# Patient Record
Sex: MALE | Race: WHITE | NOT HISPANIC OR LATINO | Employment: OTHER | URBAN - METROPOLITAN AREA
[De-identification: names, ages, dates, MRNs, and addresses within clinical notes are randomized per-mention and may not be internally consistent; named-entity substitution may affect disease eponyms.]

---

## 2024-06-27 ENCOUNTER — HOSPITAL ENCOUNTER (OUTPATIENT)
Facility: HOSPITAL | Age: 44
Discharge: HOME | End: 2024-06-27
Attending: EMERGENCY MEDICINE | Admitting: EMERGENCY MEDICINE
Payer: MEDICARE

## 2024-06-27 VITALS
WEIGHT: 181 LBS | DIASTOLIC BLOOD PRESSURE: 90 MMHG | HEART RATE: 83 BPM | SYSTOLIC BLOOD PRESSURE: 126 MMHG | TEMPERATURE: 98.8 F | BODY MASS INDEX: 30.16 KG/M2 | OXYGEN SATURATION: 96 % | HEIGHT: 65 IN | RESPIRATION RATE: 18 BRPM

## 2024-06-27 DIAGNOSIS — S33.9XXA SPRAIN OF LIGAMENT OF LUMBOSACRAL JOINT, INITIAL ENCOUNTER: ICD-10-CM

## 2024-06-27 DIAGNOSIS — M54.30 SCIATICA, UNSPECIFIED LATERALITY: Primary | ICD-10-CM

## 2024-06-27 PROCEDURE — 99203 OFFICE O/P NEW LOW 30 MIN: CPT | Performed by: EMERGENCY MEDICINE

## 2024-06-27 PROCEDURE — 25010000002 KETOROLAC TROMETHAMINE PER 15 MG: Performed by: EMERGENCY MEDICINE

## 2024-06-27 PROCEDURE — G0463 HOSPITAL OUTPT CLINIC VISIT: HCPCS | Performed by: EMERGENCY MEDICINE

## 2024-06-27 RX ORDER — HYDROCODONE BITARTRATE AND ACETAMINOPHEN 5; 325 MG/1; MG/1
1 TABLET ORAL ONCE AS NEEDED
Status: DISCONTINUED | OUTPATIENT
Start: 2024-06-27 | End: 2024-06-27 | Stop reason: HOSPADM

## 2024-06-27 RX ORDER — NAPROXEN 500 MG/1
500 TABLET ORAL 2 TIMES DAILY PRN
Qty: 20 TABLET | Refills: 0 | Status: SHIPPED | OUTPATIENT
Start: 2024-06-27

## 2024-06-27 RX ORDER — DIAZEPAM 5 MG/1
5 TABLET ORAL EVERY 6 HOURS PRN
Qty: 8 TABLET | Refills: 0 | Status: SHIPPED | OUTPATIENT
Start: 2024-06-27

## 2024-06-27 RX ORDER — DIAZEPAM 5 MG/1
10 TABLET ORAL ONCE
Status: COMPLETED | OUTPATIENT
Start: 2024-06-27 | End: 2024-06-27

## 2024-06-27 RX ORDER — KETOROLAC TROMETHAMINE 30 MG/ML
30 INJECTION, SOLUTION INTRAMUSCULAR; INTRAVENOUS ONCE
Status: COMPLETED | OUTPATIENT
Start: 2024-06-27 | End: 2024-06-27

## 2024-06-27 RX ADMIN — KETOROLAC TROMETHAMINE 30 MG: 30 INJECTION, SOLUTION INTRAMUSCULAR at 20:22

## 2024-06-27 RX ADMIN — HYDROCODONE BITARTRATE AND ACETAMINOPHEN 1 TABLET: 5; 325 TABLET ORAL at 20:22

## 2024-06-27 RX ADMIN — DIAZEPAM 10 MG: 5 TABLET ORAL at 20:22

## 2024-06-27 NOTE — Clinical Note
[facility] [place] [id number] [place] [address] [place] [address]  Phone: [phone]    Abhay Payen was seen and treated in our emergency department on 6/27/2024.  He may return to work on 06/30/2024.         Thank you for choosing [facility].    Junior Walls MD

## 2024-06-28 NOTE — FSED PROVIDER NOTE
Subjective   History of Present Illness    43-year-old male presents emergency department with lumbar back pain.  He said for the last 3 days.  He has been outside doing some strenuous activity and lifting.  The pain does not radiate past his knee.  No bowel or bladder incontinence.  No saddle anesthesia.  No fever no IV drug use.  He is on Suboxone but does not use any needles.  He is otherwise tingling stable took some over-the-counter medicine with minimal relief the pain got so unbearable that he came in here.  Worse with bending and twisting    Review of Systems    All systems negative except as otherwise mentioned in the HPI    No past medical history on file.    History of back pain and opioid dependence in remission    No Known Allergies    No past surgical history on file.    No family history on file.    Social History     Socioeconomic History    Marital status:            Objective   Physical Exam    General: Alert and oriented, conversant  Eye: PERRL, EOMI, nomal conjunctiva  HENT: Normocephalic, normal hearing, moist oral mucosa    Lungs: Nonlabored respiration, no wheezing  Heart: Normal Rate, no mumurs gallops or rubs  Abdomen: Soft, Non tender, no peritoneal signs    Musculoskeletal: Normal range of motion and strength, no tenderness or swelling  Skin: Warm and dry, no alarming rashes  Neurologic: Awake, responsive, moving all extremities, no focal deficits  Psychiatric:  Cooperative, appropriate mood and affect      Patient has mild paraspinous tenderness.  Radiates down the spine.  No step-offs or deformities.  No signs of infection    Procedures           ED Course                                           Medical Decision Making  Problems Addressed:  Sciatica, unspecified laterality: complicated acute illness or injury  Sprain of ligament of lumbosacral joint, initial encounter: complicated acute illness or injury    Risk  Prescription drug management.    43-year-old male presents  emergency department with back pain.  He has no red flag signs of serious back pain.  He gets relief with medication in the emergency department.  I gave my usual guidance about return precautions if he gets fever or any of the other red flag signs to be that we discussed including saddle anesthesia or fever.  He will return.  He is given prescription for naproxen Valium for home.  He is stable and is given follow-up with orthopedics    Final diagnoses:   Sciatica, unspecified laterality   Sprain of ligament of lumbosacral joint, initial encounter       ED Disposition  ED Disposition       ED Disposition   Discharge    Condition   Stable    Comment   --               Donovan Ziegler MD  [address] [facility] [place] [address] [phone]               Medication List        New Prescriptions      diazePAM 5 MG tablet  Commonly known as: VALIUM  Take 1 tablet by mouth Every 6 (Six) Hours As Needed for Anxiety.     naproxen 500 MG tablet  Commonly known as: NAPROSYN  Take 1 tablet by mouth 2 (Two) Times a Day As Needed for Moderate Pain.               Where to Get Your Medications        These medications were sent to [facility] [address] [place] [address] [place] - [address] [place] [address] - [phone]  - [phone]   [address] [facility] [place] [address]      Phone: [phone]   diazePAM 5 MG tablet  naproxen 500 MG tablet